# Patient Record
Sex: MALE | Race: WHITE | NOT HISPANIC OR LATINO | URBAN - METROPOLITAN AREA
[De-identification: names, ages, dates, MRNs, and addresses within clinical notes are randomized per-mention and may not be internally consistent; named-entity substitution may affect disease eponyms.]

---

## 2017-02-08 ENCOUNTER — OUTPATIENT (OUTPATIENT)
Dept: OUTPATIENT SERVICES | Facility: HOSPITAL | Age: 82
LOS: 1 days | End: 2017-02-08
Payer: MEDICARE

## 2017-02-08 VITALS
TEMPERATURE: 99 F | HEART RATE: 80 BPM | DIASTOLIC BLOOD PRESSURE: 80 MMHG | SYSTOLIC BLOOD PRESSURE: 142 MMHG | HEIGHT: 66 IN | WEIGHT: 151.9 LBS | RESPIRATION RATE: 16 BRPM

## 2017-02-08 DIAGNOSIS — R06.83 SNORING: ICD-10-CM

## 2017-02-08 DIAGNOSIS — Z87.438 PERSONAL HISTORY OF OTHER DISEASES OF MALE GENITAL ORGANS: Chronic | ICD-10-CM

## 2017-02-08 DIAGNOSIS — C66.1 MALIGNANT NEOPLASM OF RIGHT URETER: ICD-10-CM

## 2017-02-08 DIAGNOSIS — D49.5 NEOPLASM OF UNSPECIFIED BEHAVIOR OF OTHER GENITOURINARY ORGANS: Chronic | ICD-10-CM

## 2017-02-08 DIAGNOSIS — Z98.49 CATARACT EXTRACTION STATUS, UNSPECIFIED EYE: Chronic | ICD-10-CM

## 2017-02-08 DIAGNOSIS — I10 ESSENTIAL (PRIMARY) HYPERTENSION: ICD-10-CM

## 2017-02-08 LAB
APPEARANCE UR: CLEAR — SIGNIFICANT CHANGE UP
BILIRUB UR-MCNC: NEGATIVE — SIGNIFICANT CHANGE UP
BLOOD UR QL VISUAL: NEGATIVE — SIGNIFICANT CHANGE UP
BUN SERPL-MCNC: 30 MG/DL — HIGH (ref 7–23)
CALCIUM SERPL-MCNC: 9.9 MG/DL — SIGNIFICANT CHANGE UP (ref 8.4–10.5)
CHLORIDE SERPL-SCNC: 101 MMOL/L — SIGNIFICANT CHANGE UP (ref 98–107)
CO2 SERPL-SCNC: 25 MMOL/L — SIGNIFICANT CHANGE UP (ref 22–31)
COLOR SPEC: YELLOW — SIGNIFICANT CHANGE UP
CREAT SERPL-MCNC: 1.51 MG/DL — HIGH (ref 0.5–1.3)
GLUCOSE SERPL-MCNC: 95 MG/DL — SIGNIFICANT CHANGE UP (ref 70–99)
GLUCOSE UR-MCNC: NEGATIVE — SIGNIFICANT CHANGE UP
HCT VFR BLD CALC: 35.8 % — LOW (ref 39–50)
HGB BLD-MCNC: 11.8 G/DL — LOW (ref 13–17)
HYALINE CASTS # UR AUTO: SIGNIFICANT CHANGE UP (ref 0–?)
KETONES UR-MCNC: NEGATIVE — SIGNIFICANT CHANGE UP
LEUKOCYTE ESTERASE UR-ACNC: NEGATIVE — SIGNIFICANT CHANGE UP
MCHC RBC-ENTMCNC: 31.5 PG — SIGNIFICANT CHANGE UP (ref 27–34)
MCHC RBC-ENTMCNC: 33 % — SIGNIFICANT CHANGE UP (ref 32–36)
MCV RBC AUTO: 95.5 FL — SIGNIFICANT CHANGE UP (ref 80–100)
MUCOUS THREADS # UR AUTO: SIGNIFICANT CHANGE UP
NITRITE UR-MCNC: NEGATIVE — SIGNIFICANT CHANGE UP
NON-SQ EPI CELLS # UR AUTO: <1 — SIGNIFICANT CHANGE UP
PH UR: 6.5 — SIGNIFICANT CHANGE UP (ref 4.6–8)
PLATELET # BLD AUTO: 344 K/UL — SIGNIFICANT CHANGE UP (ref 150–400)
PMV BLD: 10.1 FL — SIGNIFICANT CHANGE UP (ref 7–13)
POTASSIUM SERPL-MCNC: 5 MMOL/L — SIGNIFICANT CHANGE UP (ref 3.5–5.3)
POTASSIUM SERPL-SCNC: 5 MMOL/L — SIGNIFICANT CHANGE UP (ref 3.5–5.3)
PROT UR-MCNC: 10 — SIGNIFICANT CHANGE UP
RBC # BLD: 3.75 M/UL — LOW (ref 4.2–5.8)
RBC # FLD: 13.4 % — SIGNIFICANT CHANGE UP (ref 10.3–14.5)
RBC CASTS # UR COMP ASSIST: SIGNIFICANT CHANGE UP (ref 0–?)
SODIUM SERPL-SCNC: 142 MMOL/L — SIGNIFICANT CHANGE UP (ref 135–145)
SP GR SPEC: 1.02 — SIGNIFICANT CHANGE UP (ref 1–1.03)
UROBILINOGEN FLD QL: NORMAL E.U. — SIGNIFICANT CHANGE UP (ref 0.1–0.2)
WBC # BLD: 10.7 K/UL — HIGH (ref 3.8–10.5)
WBC # FLD AUTO: 10.7 K/UL — HIGH (ref 3.8–10.5)
WBC UR QL: SIGNIFICANT CHANGE UP (ref 0–?)

## 2017-02-08 PROCEDURE — 93010 ELECTROCARDIOGRAM REPORT: CPT

## 2017-02-08 RX ORDER — SODIUM CHLORIDE 9 MG/ML
1000 INJECTION, SOLUTION INTRAVENOUS
Qty: 0 | Refills: 0 | Status: DISCONTINUED | OUTPATIENT
Start: 2017-02-22 | End: 2017-02-22

## 2017-02-08 NOTE — H&P PST ADULT - NEGATIVE OPHTHALMOLOGIC SYMPTOMS
no lacrimation R/no lacrimation L/no diplopia/no blurred vision L/no discharge L/no loss of vision L/no photophobia/no loss of vision R/no scleral injection L/no scleral injection R/no pain L/no blurred vision R/no irritation L/no discharge R/no irritation R/no pain R

## 2017-02-08 NOTE — H&P PST ADULT - NEGATIVE MUSCULOSKELETAL SYMPTOMS
no back pain/no arm pain R/no muscle weakness/no myalgia/no leg pain L/no neck pain/no joint swelling

## 2017-02-08 NOTE — H&P PST ADULT - RS GEN PE MLT RESP DETAILS PC
no wheezes/breath sounds equal/clear to auscultation bilaterally/respirations non-labored no rhonchi/no wheezes/clear to auscultation bilaterally/breath sounds equal/respirations non-labored/no rales

## 2017-02-08 NOTE — H&P PST ADULT - NEGATIVE GENERAL SYMPTOMS
no fatigue/no anorexia/no fever/no sweating/no malaise/no chills/no polyphagia/no polyuria/no weight gain/no weight loss

## 2017-02-08 NOTE — H&P PST ADULT - MUSCULOSKELETAL
details… detailed exam normal strength/no calf tenderness normal strength/no calf tenderness/ROM intact/no joint erythema/no joint warmth/no joint swelling

## 2017-02-08 NOTE — H&P PST ADULT - NSANTHOSAYNRD_GEN_A_CORE
No. MOOKIE screening performed.  STOP BANG Legend: 0-2 = LOW Risk; 3-4 = INTERMEDIATE Risk; 5-8 = HIGH Risk

## 2017-02-08 NOTE — H&P PST ADULT - NEGATIVE NEUROLOGICAL SYMPTOMS
no loss of consciousness/no facial palsy/no weakness/no transient paralysis/no loss of sensation/no hemiparesis/no focal seizures/no tremors/no headache/no generalized seizures/no syncope/no vertigo/no paresthesias

## 2017-02-08 NOTE — H&P PST ADULT - NEGATIVE ALLERGY TYPES
no reactions to food/no reactions to insect bites/no reactions to animals/no indoor environmental allergies/no outdoor environmental allergies/no reactions to medicines

## 2017-02-08 NOTE — H&P PST ADULT - PMH
BPH (benign prostatic hypertrophy)    GERD (gastroesophageal reflux disease)    HTN (hypertension)    Ureter ca, right  Dx 12/2015

## 2017-02-08 NOTE — H&P PST ADULT - NEGATIVE ENMT SYMPTOMS
no tinnitus/no nasal congestion/no dysphagia/no ear pain/no nose bleeds/no throat pain/no vertigo/no gum bleeding/no sinus symptoms

## 2017-02-08 NOTE — H&P PST ADULT - PROBLEM SELECTOR PLAN 1
83 yr old male presents for pre-op exam for cystoscopy, bilateral ureteroscopy laser fulguration, transurethral resection of bladder tumor on 2/15/17.  EKG, cbc, bmp, UA/C&S done.  Pre-op instructions provided, pt instructed to take AM Prilosec morning of surgery, pt verbalized good understanding.  Pt stated he had a "cold" 2 weeks ago, feels better now, completed Z-Pack, going back to PMD for recheck and clearance 2/10/17.

## 2017-02-08 NOTE — H&P PST ADULT - HISTORY OF PRESENT ILLNESS
83 yo white male, malignant right ureteral mass dx 12/15, s/p laser ablation 12/2015, 2/2016. [facility] C[facility], Right Uretersocopy, Laser Ablation of Ureteral tumor, Right Stent Exchange on 6/8/16. Presently denies back pain, dysuria, hematuria, fever, chest pain, SOB, changes in bowel/urinary habits. 83 yr old male, h/o HTN, BPH, GERD, ED, presents for rpe-op exam for malignant neoplasm right ureter, s/p laser ablation 12/2015, 2/2016, cystoscopy Right Ureteroscopy Laser Ablation of Ureteral tumor, Right Stent Exchange on 6/8/16.   No c/o hematuria, or dysuria.  Now scheduled for cystoscopy, bilateral ure 83 yr old male, h/o HTN, BPH, GERD, ED, presents for rpe-op exam for malignant neoplasm right ureter, s/p laser ablation 12/2015, 2/2016, cystoscopy Right Ureteroscopy Laser Ablation of Ureteral tumor, Right Stent Exchange on 6/8/16.   No c/o hematuria or dysuria.  Now scheduled for cystoscopy, bilateral ureteroscopy laser fulguration, transurethral resection of bladder tumor on 2/15/17.

## 2017-02-08 NOTE — H&P PST ADULT - NEGATIVE GENERAL GENITOURINARY SYMPTOMS
no bladder infections/no hematuria/no renal colic/no dysuria/no flank pain L/no flank pain R/no incontinence

## 2017-02-08 NOTE — H&P PST ADULT - PROBLEM SELECTOR PLAN 2
BP in PAST today 142/80, no complaints.  Pt instructed to take AM Cozaar morning of surgery, pt verbalized good understanding.

## 2017-02-08 NOTE — H&P PST ADULT - VENOUS THROMBOEMBOLISM CURRENT STATUS
present cancer or chemotherapy/major surgery, including arthroscopic and laparoscopic (greater than 1 hr)

## 2017-02-08 NOTE — H&P PST ADULT - PSH
H/O hydrocele    S/P cataract surgery    Ureteral tumor  cystoscopy, stent inserted 12/15 & 2/2016, Right Uretersocopy, Laser Ablation of Ureteral tumor, Right Stent Exchange on 6/8/16

## 2017-02-08 NOTE — H&P PST ADULT - NEUROLOGICAL DETAILS
alert and oriented x 3/responds to verbal commands/sensation intact/normal strength sensation intact/alert and oriented x 3/responds to pain/normal strength/responds to verbal commands

## 2017-02-08 NOTE — H&P PST ADULT - GASTROINTESTINAL DETAILS
no distention/bowel sounds normal/no masses palpable/no bruit/nontender/soft nontender/no masses palpable/bowel sounds normal/no distention/soft

## 2017-02-10 LAB — SPECIMEN SOURCE: SIGNIFICANT CHANGE UP

## 2017-02-12 LAB
-  AMPICILLIN: SIGNIFICANT CHANGE UP
-  CIPROFLOXACIN: SIGNIFICANT CHANGE UP
-  NITROFURANTOIN: SIGNIFICANT CHANGE UP
-  TETRACYCLINE: SIGNIFICANT CHANGE UP
-  VANCOMYCIN: SIGNIFICANT CHANGE UP
BACTERIA UR CULT: SIGNIFICANT CHANGE UP
METHOD TYPE: SIGNIFICANT CHANGE UP
ORGANISM # SPEC MICROSCOPIC CNT: SIGNIFICANT CHANGE UP
ORGANISM # SPEC MICROSCOPIC CNT: SIGNIFICANT CHANGE UP

## 2017-02-14 ENCOUNTER — MEDICATION RENEWAL (OUTPATIENT)
Age: 82
End: 2017-02-14

## 2017-02-14 DIAGNOSIS — N39.0 URINARY TRACT INFECTION, SITE NOT SPECIFIED: ICD-10-CM

## 2017-02-14 RX ORDER — AMOXICILLIN AND CLAVULANATE POTASSIUM 875; 125 MG/1; MG/1
875-125 TABLET, COATED ORAL
Qty: 6 | Refills: 0 | Status: ACTIVE | COMMUNITY
Start: 2017-02-14 | End: 1900-01-01

## 2017-02-17 ENCOUNTER — APPOINTMENT (OUTPATIENT)
Dept: UROLOGY | Facility: CLINIC | Age: 82
End: 2017-02-17

## 2017-02-21 ENCOUNTER — APPOINTMENT (OUTPATIENT)
Dept: UROLOGY | Facility: CLINIC | Age: 82
End: 2017-02-21

## 2017-02-21 ENCOUNTER — RESULT REVIEW (OUTPATIENT)
Age: 82
End: 2017-02-21

## 2017-02-22 ENCOUNTER — OUTPATIENT (OUTPATIENT)
Dept: OUTPATIENT SERVICES | Facility: HOSPITAL | Age: 82
LOS: 1 days | Discharge: HOME | End: 2017-02-22
Payer: MEDICARE

## 2017-02-22 ENCOUNTER — APPOINTMENT (OUTPATIENT)
Dept: UROLOGY | Facility: HOSPITAL | Age: 82
End: 2017-02-22

## 2017-02-22 VITALS
DIASTOLIC BLOOD PRESSURE: 80 MMHG | SYSTOLIC BLOOD PRESSURE: 168 MMHG | OXYGEN SATURATION: 96 % | RESPIRATION RATE: 17 BRPM | HEART RATE: 75 BPM

## 2017-02-22 VITALS
HEART RATE: 82 BPM | RESPIRATION RATE: 16 BRPM | HEIGHT: 66 IN | OXYGEN SATURATION: 98 % | TEMPERATURE: 98 F | DIASTOLIC BLOOD PRESSURE: 67 MMHG | SYSTOLIC BLOOD PRESSURE: 161 MMHG | WEIGHT: 151.9 LBS

## 2017-02-22 DIAGNOSIS — D49.5 NEOPLASM OF UNSPECIFIED BEHAVIOR OF OTHER GENITOURINARY ORGANS: Chronic | ICD-10-CM

## 2017-02-22 DIAGNOSIS — Z87.438 PERSONAL HISTORY OF OTHER DISEASES OF MALE GENITAL ORGANS: Chronic | ICD-10-CM

## 2017-02-22 DIAGNOSIS — C66.1 MALIGNANT NEOPLASM OF RIGHT URETER: ICD-10-CM

## 2017-02-22 DIAGNOSIS — Z98.49 CATARACT EXTRACTION STATUS, UNSPECIFIED EYE: Chronic | ICD-10-CM

## 2017-02-22 PROCEDURE — 88307 TISSUE EXAM BY PATHOLOGIST: CPT | Mod: 26

## 2017-02-22 PROCEDURE — 52224 CYSTOSCOPY AND TREATMENT: CPT

## 2017-02-22 PROCEDURE — 52351 CYSTOURETERO & OR PYELOSCOPE: CPT | Mod: RT

## 2017-02-22 RX ORDER — ACETAMINOPHEN 500 MG
2 TABLET ORAL
Qty: 0 | Refills: 0 | COMMUNITY
Start: 2017-02-22

## 2017-02-22 RX ORDER — LABETALOL HCL 100 MG
10 TABLET ORAL ONCE
Qty: 0 | Refills: 0 | Status: COMPLETED | OUTPATIENT
Start: 2017-02-22 | End: 2017-02-22

## 2017-02-22 RX ORDER — ACETAMINOPHEN 500 MG
650 TABLET ORAL EVERY 6 HOURS
Qty: 0 | Refills: 0 | Status: DISCONTINUED | OUTPATIENT
Start: 2017-02-22 | End: 2017-03-09

## 2017-02-22 RX ORDER — ASPIRIN/CALCIUM CARB/MAGNESIUM 324 MG
1 TABLET ORAL
Qty: 0 | Refills: 0 | COMMUNITY

## 2017-02-22 RX ORDER — LABETALOL HCL 100 MG
20 TABLET ORAL ONCE
Qty: 0 | Refills: 0 | Status: COMPLETED | OUTPATIENT
Start: 2017-02-22 | End: 2017-02-22

## 2017-02-22 RX ORDER — SODIUM CHLORIDE 9 MG/ML
1000 INJECTION, SOLUTION INTRAVENOUS
Qty: 0 | Refills: 0 | Status: DISCONTINUED | OUTPATIENT
Start: 2017-02-22 | End: 2017-03-09

## 2017-02-22 RX ADMIN — Medication 10 MILLIGRAM(S): at 14:07

## 2017-02-22 RX ADMIN — SODIUM CHLORIDE 30 MILLILITER(S): 9 INJECTION, SOLUTION INTRAVENOUS at 10:55

## 2017-02-22 RX ADMIN — Medication 20 MILLIGRAM(S): at 14:27

## 2017-02-22 NOTE — ASU DISCHARGE PLAN (ADULT/PEDIATRIC). - NOTIFY
Fever greater than 101/Persistent Nausea and Vomiting/Bleeding that does not stop/Unable to Urinate/Inability to Tolerate Liquids or Foods Inability to Tolerate Liquids or Foods/Unable to Urinate/Bleeding that does not stop/Fever greater than 101/Swelling that continues/Pain not relieved by Medications/Numbness, color, or temperature change to extremity/Persistent Nausea and Vomiting

## 2017-02-22 NOTE — ASU DISCHARGE PLAN (ADULT/PEDIATRIC). - MEDICATION SUMMARY - MEDICATIONS TO TAKE
I will START or STAY ON the medications listed below when I get home from the hospital:    Viagra 100 mg oral tablet  -- 1 tab(s) by mouth once a day, As Needed  -- Indication: For home med    acetaminophen 325 mg oral tablet  -- 2 tab(s) by mouth every 6 hours, As needed, mild to moderate pain  -- Indication: For Mild to moderate pain    Aspirin Low Dose 81 mg oral delayed release tablet  -- 1 tab(s) by mouth once a day - restart tomorrow if urine is clear  -- Indication: For home med    Cozaar 100 mg oral tablet  -- 1 tab(s) by mouth once a day  -- Indication: For home med    tamsulosin 0.4 mg oral capsule  -- 1 cap(s) by mouth once a day  -- Indication: For home med    magnesium citrate 100 mg oral tablet  -- 1 tab(s) by mouth   -- Indication: For home med    Co Q-10 100 mg oral capsule  -- 1 cap(s) by mouth once a day - last dose 2/8/17  -- Indication: For home med    Fish Oil oral capsule  -- 1  by mouth once a day - last dose 2/8/17  -- Indication: For home med    PriLOSEC OTC 20 mg oral delayed release tablet  -- 1 tab(s) by mouth once a day  -- Indication: For home med    multivitamin  -- 1 tab(s) by mouth once a day - last dose 2/8/17  -- Indication: For home med    Calcium 600+D oral tablet  -- 1 tab(s) by mouth once a day  -- Indication: For home med    Vitamin D2  -- 1 tab(s) by mouth once a day  -- Indication: For home med    Vitamin B1 100 mg oral tablet  -- 1 tab(s) by mouth once a day  -- Indication: For home med I will START or STAY ON the medications listed below when I get home from the hospital:    Viagra 100 mg oral tablet  -- 1 tab(s) by mouth once a day, As Needed  -- Indication: For home med    acetaminophen 325 mg oral tablet  -- 2 tab(s) by mouth every 6 hours, As needed, mild to moderate pain  -- Indication: For Mild to moderate pain    Aspirin Low Dose 81 mg oral delayed release tablet  -- 1 tab(s) by mouth once a day - restart tomorrow if urine is clear  -- Indication: For home med    Cozaar 100 mg oral tablet  -- 1 tab(s) by mouth once a day  -- Indication: For home med    tamsulosin 0.4 mg oral capsule  -- 1 cap(s) by mouth once a day  -- Indication: For home med    magnesium citrate 100 mg oral tablet  -- 1 tab(s) by mouth   -- Indication: For home med    Co Q-10 100 mg oral capsule  -- 1 cap(s) by mouth once a day - last dose 2/8/17  -- Indication: For home med    Fish Oil oral capsule  -- 1  by mouth once a day - last dose 2/8/17  -- Indication: For home med    Augmentin 875 mg-125 mg oral tablet  -- 1 tab(s) by mouth every 12 hours  -- Finish all this medication unless otherwise directed by prescriber.  Take with food or milk.    -- Indication: For post-op abx    PriLOSEC OTC 20 mg oral delayed release tablet  -- 1 tab(s) by mouth once a day  -- Indication: For home med    multivitamin  -- 1 tab(s) by mouth once a day - last dose 2/8/17  -- Indication: For home med    Calcium 600+D oral tablet  -- 1 tab(s) by mouth once a day  -- Indication: For home med    Vitamin D2  -- 1 tab(s) by mouth once a day  -- Indication: For home med    Vitamin B1 100 mg oral tablet  -- 1 tab(s) by mouth once a day  -- Indication: For home med

## 2017-02-22 NOTE — BRIEF OPERATIVE NOTE - PROCEDURE
Ureteroscopy, rigid  02/22/2017  right  Active  [id number]  Retrograde pyelography of both kidneys  02/22/2017    Active  [id number]  Bladder biopsy, transurethral  02/22/2017    [facility]

## 2017-02-23 ENCOUNTER — TRANSCRIPTION ENCOUNTER (OUTPATIENT)
Age: 82
End: 2017-02-23

## 2017-02-23 ENCOUNTER — RECORD ABSTRACTING (OUTPATIENT)
Age: 82
End: 2017-02-23

## 2017-02-23 LAB — SPECIMEN SOURCE: SIGNIFICANT CHANGE UP

## 2017-02-24 ENCOUNTER — APPOINTMENT (OUTPATIENT)
Dept: UROLOGY | Facility: CLINIC | Age: 82
End: 2017-02-24

## 2017-02-24 LAB — BACTERIA UR CULT: SIGNIFICANT CHANGE UP

## 2017-02-27 LAB — SURGICAL PATHOLOGY STUDY: SIGNIFICANT CHANGE UP

## 2017-03-01 ENCOUNTER — OUTPATIENT (OUTPATIENT)
Dept: OUTPATIENT SERVICES | Facility: HOSPITAL | Age: 82
LOS: 1 days | Discharge: HOME | End: 2017-03-01
Payer: MEDICARE

## 2017-03-01 DIAGNOSIS — D49.5 NEOPLASM OF UNSPECIFIED BEHAVIOR OF OTHER GENITOURINARY ORGANS: Chronic | ICD-10-CM

## 2017-03-01 DIAGNOSIS — Z98.49 CATARACT EXTRACTION STATUS, UNSPECIFIED EYE: Chronic | ICD-10-CM

## 2017-03-01 DIAGNOSIS — C66.1 MALIGNANT NEOPLASM OF RIGHT URETER: ICD-10-CM

## 2017-03-01 DIAGNOSIS — Z87.438 PERSONAL HISTORY OF OTHER DISEASES OF MALE GENITAL ORGANS: Chronic | ICD-10-CM

## 2017-03-01 PROCEDURE — 78708 K FLOW/FUNCT IMAGE W/DRUG: CPT | Mod: 26

## 2017-03-07 ENCOUNTER — APPOINTMENT (OUTPATIENT)
Dept: UROLOGY | Facility: CLINIC | Age: 82
End: 2017-03-07

## 2017-03-09 ENCOUNTER — APPOINTMENT (OUTPATIENT)
Dept: UROLOGY | Facility: CLINIC | Age: 82
End: 2017-03-09

## 2017-03-10 DIAGNOSIS — Z86.79 PERSONAL HISTORY OF OTHER DISEASES OF THE CIRCULATORY SYSTEM: ICD-10-CM

## 2017-03-10 DIAGNOSIS — K30 FUNCTIONAL DYSPEPSIA: ICD-10-CM

## 2017-03-10 DIAGNOSIS — Z80.0 FAMILY HISTORY OF MALIGNANT NEOPLASM OF DIGESTIVE ORGANS: ICD-10-CM

## 2017-03-13 ENCOUNTER — CHART COPY (OUTPATIENT)
Age: 82
End: 2017-03-13

## 2017-03-13 ENCOUNTER — OUTPATIENT (OUTPATIENT)
Dept: OUTPATIENT SERVICES | Facility: HOSPITAL | Age: 82
LOS: 1 days | End: 2017-03-13
Payer: MEDICARE

## 2017-03-13 ENCOUNTER — APPOINTMENT (OUTPATIENT)
Dept: INTERVENTIONAL RADIOLOGY/VASCULAR | Facility: CLINIC | Age: 82
End: 2017-03-13

## 2017-03-13 VITALS
DIASTOLIC BLOOD PRESSURE: 75 MMHG | HEART RATE: 84 BPM | RESPIRATION RATE: 18 BRPM | SYSTOLIC BLOOD PRESSURE: 154 MMHG | OXYGEN SATURATION: 97 % | WEIGHT: 150 LBS | BODY MASS INDEX: 22.73 KG/M2 | HEIGHT: 68 IN

## 2017-03-13 VITALS
SYSTOLIC BLOOD PRESSURE: 130 MMHG | DIASTOLIC BLOOD PRESSURE: 70 MMHG | WEIGHT: 147.93 LBS | TEMPERATURE: 99 F | HEIGHT: 66.5 IN | HEART RATE: 81 BPM | RESPIRATION RATE: 16 BRPM

## 2017-03-13 DIAGNOSIS — N40.0 BENIGN PROSTATIC HYPERPLASIA WITHOUT LOWER URINARY TRACT SYMPMS: ICD-10-CM

## 2017-03-13 DIAGNOSIS — D49.5 NEOPLASM OF UNSPECIFIED BEHAVIOR OF OTHER GENITOURINARY ORGANS: Chronic | ICD-10-CM

## 2017-03-13 DIAGNOSIS — C66.1 MALIGNANT NEOPLASM OF RIGHT URETER: ICD-10-CM

## 2017-03-13 DIAGNOSIS — Z98.890 OTHER SPECIFIED POSTPROCEDURAL STATES: Chronic | ICD-10-CM

## 2017-03-13 DIAGNOSIS — Z87.438 PERSONAL HISTORY OF OTHER DISEASES OF MALE GENITAL ORGANS: Chronic | ICD-10-CM

## 2017-03-13 DIAGNOSIS — Z98.49 CATARACT EXTRACTION STATUS, UNSPECIFIED EYE: Chronic | ICD-10-CM

## 2017-03-13 PROBLEM — K30 INDIGESTION: Status: RESOLVED | Noted: 2017-03-13 | Resolved: 2017-03-13

## 2017-03-13 PROBLEM — Z86.79 HISTORY OF HYPERTENSION: Status: RESOLVED | Noted: 2017-03-13 | Resolved: 2017-03-13

## 2017-03-13 LAB
APPEARANCE UR: CLEAR — SIGNIFICANT CHANGE UP
APTT BLD: 33.1 SEC — SIGNIFICANT CHANGE UP (ref 27.5–37.4)
BILIRUB UR-MCNC: NEGATIVE — SIGNIFICANT CHANGE UP
BLD GP AB SCN SERPL QL: NEGATIVE — SIGNIFICANT CHANGE UP
BLOOD UR QL VISUAL: HIGH
BUN SERPL-MCNC: 35 MG/DL — HIGH (ref 7–23)
CALCIUM SERPL-MCNC: 9.3 MG/DL — SIGNIFICANT CHANGE UP (ref 8.4–10.5)
CHLORIDE SERPL-SCNC: 102 MMOL/L — SIGNIFICANT CHANGE UP (ref 98–107)
CO2 SERPL-SCNC: 25 MMOL/L — SIGNIFICANT CHANGE UP (ref 22–31)
COLOR SPEC: YELLOW — SIGNIFICANT CHANGE UP
CREAT SERPL-MCNC: 1.41 MG/DL — HIGH (ref 0.5–1.3)
GLUCOSE SERPL-MCNC: 91 MG/DL — SIGNIFICANT CHANGE UP (ref 70–99)
GLUCOSE UR-MCNC: NEGATIVE — SIGNIFICANT CHANGE UP
HCT VFR BLD CALC: 37.4 % — LOW (ref 39–50)
HGB BLD-MCNC: 12.2 G/DL — LOW (ref 13–17)
INR BLD: 1.03 — SIGNIFICANT CHANGE UP (ref 0.87–1.18)
KETONES UR-MCNC: NEGATIVE — SIGNIFICANT CHANGE UP
LEUKOCYTE ESTERASE UR-ACNC: HIGH
MCHC RBC-ENTMCNC: 31.5 PG — SIGNIFICANT CHANGE UP (ref 27–34)
MCHC RBC-ENTMCNC: 32.6 % — SIGNIFICANT CHANGE UP (ref 32–36)
MCV RBC AUTO: 96.6 FL — SIGNIFICANT CHANGE UP (ref 80–100)
MUCOUS THREADS # UR AUTO: SIGNIFICANT CHANGE UP
NITRITE UR-MCNC: NEGATIVE — SIGNIFICANT CHANGE UP
PH UR: 6 — SIGNIFICANT CHANGE UP (ref 4.6–8)
PLATELET # BLD AUTO: 249 K/UL — SIGNIFICANT CHANGE UP (ref 150–400)
PMV BLD: 10.4 FL — SIGNIFICANT CHANGE UP (ref 7–13)
POTASSIUM SERPL-MCNC: 4.3 MMOL/L — SIGNIFICANT CHANGE UP (ref 3.5–5.3)
POTASSIUM SERPL-SCNC: 4.3 MMOL/L — SIGNIFICANT CHANGE UP (ref 3.5–5.3)
PROT UR-MCNC: 20 — SIGNIFICANT CHANGE UP
PROTHROM AB SERPL-ACNC: 11.7 SEC — SIGNIFICANT CHANGE UP (ref 10–13.1)
RBC # BLD: 3.87 M/UL — LOW (ref 4.2–5.8)
RBC # FLD: 14.4 % — SIGNIFICANT CHANGE UP (ref 10.3–14.5)
RBC CASTS # UR COMP ASSIST: >50 — HIGH (ref 0–?)
RH IG SCN BLD-IMP: POSITIVE — SIGNIFICANT CHANGE UP
SODIUM SERPL-SCNC: 141 MMOL/L — SIGNIFICANT CHANGE UP (ref 135–145)
SP GR SPEC: 1.02 — SIGNIFICANT CHANGE UP (ref 1–1.03)
UROBILINOGEN FLD QL: NORMAL E.U. — SIGNIFICANT CHANGE UP (ref 0.1–0.2)
WBC # BLD: 8.48 K/UL — SIGNIFICANT CHANGE UP (ref 3.8–10.5)
WBC # FLD AUTO: 8.48 K/UL — SIGNIFICANT CHANGE UP (ref 3.8–10.5)
WBC CLUMPS #/AREA URNS HPF: PRESENT — HIGH (ref 0–?)
WBC UR QL: SIGNIFICANT CHANGE UP (ref 0–?)

## 2017-03-13 PROCEDURE — 93010 ELECTROCARDIOGRAM REPORT: CPT

## 2017-03-13 RX ORDER — TAMSULOSIN HYDROCHLORIDE 0.4 MG/1
0.4 CAPSULE ORAL
Qty: 180 | Refills: 0 | Status: ACTIVE | COMMUNITY
Start: 2016-10-24

## 2017-03-13 RX ORDER — LOSARTAN POTASSIUM 100 MG/1
100 TABLET, FILM COATED ORAL
Qty: 90 | Refills: 0 | Status: ACTIVE | COMMUNITY
Start: 2016-09-12

## 2017-03-13 RX ORDER — ERGOCALCIFEROL 1.25 MG/1
1 CAPSULE ORAL
Qty: 0 | Refills: 0 | COMMUNITY

## 2017-03-13 RX ORDER — OMEPRAZOLE 10 MG/1
1 CAPSULE, DELAYED RELEASE ORAL
Qty: 0 | Refills: 0 | COMMUNITY

## 2017-03-13 RX ORDER — OMEGA-3 ACID ETHYL ESTERS 1 G
1 CAPSULE ORAL
Qty: 0 | Refills: 0 | COMMUNITY

## 2017-03-13 RX ORDER — MULTIVIT WITH MIN/MFOLATE/K2 340-15/3 G
1 POWDER (GRAM) ORAL
Qty: 0 | Refills: 0 | COMMUNITY

## 2017-03-13 RX ORDER — THIAMINE MONONITRATE (VIT B1) 100 MG
1 TABLET ORAL
Qty: 0 | Refills: 0 | COMMUNITY

## 2017-03-13 RX ORDER — OMEPRAZOLE MAGNESIUM 20 MG/1
20 CAPSULE, DELAYED RELEASE ORAL
Refills: 0 | Status: ACTIVE | COMMUNITY

## 2017-03-13 RX ORDER — ASPIRIN/CALCIUM CARB/MAGNESIUM 324 MG
1 TABLET ORAL
Qty: 0 | Refills: 0 | COMMUNITY

## 2017-03-13 RX ORDER — LOSARTAN POTASSIUM 100 MG/1
1 TABLET, FILM COATED ORAL
Qty: 0 | Refills: 0 | COMMUNITY

## 2017-03-13 RX ORDER — TAMSULOSIN HYDROCHLORIDE 0.4 MG/1
1 CAPSULE ORAL
Qty: 0 | Refills: 0 | COMMUNITY

## 2017-03-13 RX ORDER — UBIDECARENONE 100 MG
1 CAPSULE ORAL
Qty: 0 | Refills: 0 | COMMUNITY

## 2017-03-13 RX ORDER — AZITHROMYCIN 250 MG/1
250 TABLET, FILM COATED ORAL
Qty: 6 | Refills: 0 | Status: COMPLETED | COMMUNITY
Start: 2017-02-06

## 2017-03-13 NOTE — H&P PST ADULT - NEGATIVE MUSCULOSKELETAL SYMPTOMS
no joint swelling/no leg pain L/no myalgia/no arm pain R/no back pain/no muscle weakness/no neck pain

## 2017-03-13 NOTE — H&P PST ADULT - ASSESSMENT
83 yr old male, h/o HTN, BPH, GERD,  presents for pre-op exam for malignant neoplasm right ureter. S/P laser ablation 12/2015, 2/2016,   S/P cystoscopy 2/2017.  Per pt, right ureter is blocked.  Now scheduled for Right Antegrade ureteroscopy, Robotic Right Ureteral resection and reimplant 3/20/17

## 2017-03-13 NOTE — H&P PST ADULT - PSH
H/O hydrocele    S/P cataract surgery    Ureteral tumor  cystoscopy, stent inserted 12/15 & 2/2016, Right Uretersocopy, Laser Ablation of Ureteral tumor, Right Stent Exchange on 6/8/16 H/O cystoscopy  2/2017  H/O hydrocele    S/P cataract surgery    Ureteral tumor  cystoscopy, stent inserted 12/15 & 2/2016, Right Uretersocopy, Laser Ablation of Ureteral tumor, Right Stent Exchange on 6/8/16

## 2017-03-13 NOTE — H&P PST ADULT - NEGATIVE GENERAL GENITOURINARY SYMPTOMS
no dysuria/no hematuria/normal urinary frequency/no incontinence/no bladder infections/no renal colic/no flank pain L/no flank pain R

## 2017-03-13 NOTE — H&P PST ADULT - ABILITY TO HEAR (WITH HEARING AID OR HEARING APPLIANCE IF NORMALLY USED):
left ear worse than right ear/Mildly to Moderately Impaired: difficulty hearing in some environments or speaker may need to increase volume or speak distinctly

## 2017-03-13 NOTE — H&P PST ADULT - NEGATIVE ENMT SYMPTOMS
no tinnitus/no nose bleeds/no throat pain/no dysphagia/no vertigo/no sinus symptoms/no gum bleeding/no ear pain

## 2017-03-13 NOTE — H&P PST ADULT - NEUROLOGICAL DETAILS
alert and oriented x 3/responds to pain/normal strength/sensation intact/responds to verbal commands

## 2017-03-13 NOTE — H&P PST ADULT - HISTORY OF PRESENT ILLNESS
83 yr old male, h/o HTN, BPH, GERD,  presents for pre-op exam for malignant neoplasm right ureter. S/P laser ablation 12/2015, 2/2016,   S/P cystoscopy 2/2017.  Per pt, ureter is blocked.  Now scheduled for Right Antegrade ureteroscopy, Robotic Right Ureteral resection and reimplant 3/20/17. 83 yr old male, h/o HTN, BPH, GERD,  presents for pre-op exam for malignant neoplasm right ureter. S/P laser ablation 12/2015, 2/2016,   S/P cystoscopy 2/2017.  Per pt, right ureter is blocked.  Now scheduled for Right Antegrade ureteroscopy, Robotic Right Ureteral resection and reimplant 3/20/17.

## 2017-03-13 NOTE — H&P PST ADULT - ATTENDING COMMENTS
all r/b/a and complications discussed  discussed distal ureterectomy, possible nephroureterectomy, LND  possible Boari/advancement flap   ALL side effects and potential complications reviewed.

## 2017-03-13 NOTE — H&P PST ADULT - NEGATIVE GENERAL SYMPTOMS
no weight gain/no malaise/no fever/no polyphagia/no polyuria/no anorexia/no fatigue/no sweating/no weight loss/no chills

## 2017-03-13 NOTE — H&P PST ADULT - MUSCULOSKELETAL
details… detailed exam no joint erythema/no joint swelling/ROM intact/no calf tenderness/normal strength/no joint warmth decreased ROM due to pain/no joint swelling/ROM intact/normal strength/no joint warmth/left shoulder pain with movement/no joint erythema/no calf tenderness

## 2017-03-13 NOTE — H&P PST ADULT - NEGATIVE ALLERGY TYPES
no reactions to food/no reactions to insect bites/no indoor environmental allergies/no reactions to animals/no outdoor environmental allergies/no reactions to medicines

## 2017-03-13 NOTE — H&P PST ADULT - PROBLEM SELECTOR PLAN 1
Right Antegrade ureteroscopy, Robotic Right Ureteral resection and reimplant 3/20/17.    CBC BMP PT/PTT T&S UA CS EKG    Pt reports he has appt to see PMD for clearance    MOOKIE precautions, OR booking informed

## 2017-03-13 NOTE — H&P PST ADULT - GENITOURINARY COMMENTS
83 yr old male, h/o HTN, BPH, GERD,  presents for pre-op exam for malignant neoplasm right ureter. S/P laser ablation 12/2015, 2/2016,   S/P cystoscopy 2/2017.  Per pt, ureter is blocked.  Now scheduled for Right Antegrade ureteroscopy, Robotic Right Ureteral resection and reimplant 3/20/17. Hx of HTN, BPH, GERD,  presents for pre-op exam for malignant neoplasm right ureter. S/P laser ablation 12/2015, 2/2016,   S/P cystoscopy 2/2017.  Per pt, right ureter is blocked.  Now scheduled for Right Antegrade ureteroscopy, Robotic Right Ureteral resection and reimplant 3/20/17.

## 2017-03-13 NOTE — H&P PST ADULT - RS GEN PE MLT RESP DETAILS PC
no rales/no rhonchi/clear to auscultation bilaterally/breath sounds equal/respirations non-labored/no wheezes

## 2017-03-13 NOTE — H&P PST ADULT - NEGATIVE NEUROLOGICAL SYMPTOMS
no tremors/no headache/no syncope/no paresthesias/no loss of sensation/no loss of consciousness/no facial palsy/no weakness/no vertigo/no generalized seizures/no transient paralysis/no hemiparesis/no focal seizures

## 2017-03-14 LAB — SPECIMEN SOURCE: SIGNIFICANT CHANGE UP

## 2017-03-16 ENCOUNTER — FORM ENCOUNTER (OUTPATIENT)
Age: 82
End: 2017-03-16

## 2017-03-17 ENCOUNTER — OUTPATIENT (OUTPATIENT)
Dept: OUTPATIENT SERVICES | Facility: HOSPITAL | Age: 82
LOS: 1 days | End: 2017-03-17
Payer: MEDICARE

## 2017-03-17 DIAGNOSIS — N13.30 UNSPECIFIED HYDRONEPHROSIS: ICD-10-CM

## 2017-03-17 DIAGNOSIS — Z98.49 CATARACT EXTRACTION STATUS, UNSPECIFIED EYE: Chronic | ICD-10-CM

## 2017-03-17 DIAGNOSIS — Z87.438 PERSONAL HISTORY OF OTHER DISEASES OF MALE GENITAL ORGANS: Chronic | ICD-10-CM

## 2017-03-17 DIAGNOSIS — Z98.890 OTHER SPECIFIED POSTPROCEDURAL STATES: Chronic | ICD-10-CM

## 2017-03-17 DIAGNOSIS — D49.5 NEOPLASM OF UNSPECIFIED BEHAVIOR OF OTHER GENITOURINARY ORGANS: Chronic | ICD-10-CM

## 2017-03-17 PROCEDURE — 50432 PLMT NEPHROSTOMY CATHETER: CPT | Mod: RT

## 2017-03-17 RX ORDER — SODIUM CHLORIDE 9 MG/ML
1000 INJECTION INTRAMUSCULAR; INTRAVENOUS; SUBCUTANEOUS
Qty: 0 | Refills: 0 | Status: DISCONTINUED | OUTPATIENT
Start: 2017-03-20 | End: 2017-03-20

## 2017-03-17 NOTE — ASU PATIENT PROFILE, ADULT - REASON FOR ADMISSION, PROFILE
I'm having surgery on my Right Ureter and they will be checking my Right Kidney with possible surgery if necessary

## 2017-03-19 ENCOUNTER — RESULT REVIEW (OUTPATIENT)
Age: 82
End: 2017-03-19

## 2017-03-19 LAB
-  AMPICILLIN: SIGNIFICANT CHANGE UP
-  CIPROFLOXACIN: SIGNIFICANT CHANGE UP
-  LINEZOLID: SIGNIFICANT CHANGE UP
-  NITROFURANTOIN: SIGNIFICANT CHANGE UP
-  TETRACYCLINE: SIGNIFICANT CHANGE UP
-  VANCOMYCIN: SIGNIFICANT CHANGE UP
BACTERIA UR CULT: SIGNIFICANT CHANGE UP
METHOD TYPE: SIGNIFICANT CHANGE UP
ORGANISM # SPEC MICROSCOPIC CNT: SIGNIFICANT CHANGE UP
ORGANISM # SPEC MICROSCOPIC CNT: SIGNIFICANT CHANGE UP

## 2017-03-20 ENCOUNTER — INPATIENT (INPATIENT)
Facility: HOSPITAL | Age: 82
LOS: 1 days | Discharge: HOME HEALTH | End: 2017-03-22
Attending: UROLOGY | Admitting: UROLOGY
Payer: MEDICARE

## 2017-03-20 ENCOUNTER — APPOINTMENT (OUTPATIENT)
Dept: UROLOGY | Facility: HOSPITAL | Age: 82
End: 2017-03-20

## 2017-03-20 VITALS
HEIGHT: 66.5 IN | RESPIRATION RATE: 18 BRPM | SYSTOLIC BLOOD PRESSURE: 139 MMHG | DIASTOLIC BLOOD PRESSURE: 66 MMHG | WEIGHT: 147.93 LBS | HEART RATE: 76 BPM | TEMPERATURE: 98 F | OXYGEN SATURATION: 97 %

## 2017-03-20 DIAGNOSIS — Z98.49 CATARACT EXTRACTION STATUS, UNSPECIFIED EYE: Chronic | ICD-10-CM

## 2017-03-20 DIAGNOSIS — Z98.890 OTHER SPECIFIED POSTPROCEDURAL STATES: Chronic | ICD-10-CM

## 2017-03-20 DIAGNOSIS — C66.1 MALIGNANT NEOPLASM OF RIGHT URETER: ICD-10-CM

## 2017-03-20 DIAGNOSIS — D49.5 NEOPLASM OF UNSPECIFIED BEHAVIOR OF OTHER GENITOURINARY ORGANS: Chronic | ICD-10-CM

## 2017-03-20 DIAGNOSIS — Z87.438 PERSONAL HISTORY OF OTHER DISEASES OF MALE GENITAL ORGANS: Chronic | ICD-10-CM

## 2017-03-20 LAB — RH IG SCN BLD-IMP: POSITIVE — SIGNIFICANT CHANGE UP

## 2017-03-20 PROCEDURE — 50551 KIDNEY ENDOSCOPY: CPT | Mod: RT

## 2017-03-20 PROCEDURE — 74000: CPT | Mod: 26

## 2017-03-20 PROCEDURE — 74425 UROGRAPHY ANTEGRADE RS&I: CPT | Mod: 26

## 2017-03-20 PROCEDURE — 50947 LAPARO NEW URETER/BLADDER: CPT

## 2017-03-20 PROCEDURE — 38571 LAPAROSCOPY LYMPHADENECTOMY: CPT

## 2017-03-20 PROCEDURE — 88307 TISSUE EXAM BY PATHOLOGIST: CPT | Mod: 26

## 2017-03-20 PROCEDURE — 50432 PLMT NEPHROSTOMY CATHETER: CPT | Mod: RT

## 2017-03-20 PROCEDURE — 52351 CYSTOURETERO & OR PYELOSCOPE: CPT | Mod: RT

## 2017-03-20 PROCEDURE — 50389 REMOVE RENAL TUBE W/FLUORO: CPT | Mod: RT

## 2017-03-20 RX ORDER — LOSARTAN POTASSIUM 100 MG/1
1 TABLET, FILM COATED ORAL
Qty: 0 | Refills: 0 | COMMUNITY

## 2017-03-20 RX ORDER — OMEPRAZOLE 10 MG/1
1 CAPSULE, DELAYED RELEASE ORAL
Qty: 0 | Refills: 0 | COMMUNITY

## 2017-03-20 RX ORDER — LINEZOLID 600 MG/300ML
600 INJECTION, SOLUTION INTRAVENOUS ONCE
Qty: 0 | Refills: 0 | Status: COMPLETED | OUTPATIENT
Start: 2017-03-20 | End: 2017-03-20

## 2017-03-20 RX ORDER — ONDANSETRON 8 MG/1
4 TABLET, FILM COATED ORAL ONCE
Qty: 0 | Refills: 0 | Status: DISCONTINUED | OUTPATIENT
Start: 2017-03-20 | End: 2017-03-20

## 2017-03-20 RX ORDER — SENNA PLUS 8.6 MG/1
2 TABLET ORAL AT BEDTIME
Qty: 0 | Refills: 0 | Status: DISCONTINUED | OUTPATIENT
Start: 2017-03-20 | End: 2017-03-22

## 2017-03-20 RX ORDER — LINEZOLID 600 MG/300ML
600 INJECTION, SOLUTION INTRAVENOUS EVERY 12 HOURS
Qty: 0 | Refills: 0 | Status: DISCONTINUED | OUTPATIENT
Start: 2017-03-20 | End: 2017-03-21

## 2017-03-20 RX ORDER — ASPIRIN/CALCIUM CARB/MAGNESIUM 324 MG
1 TABLET ORAL
Qty: 0 | Refills: 0 | COMMUNITY

## 2017-03-20 RX ORDER — TAMSULOSIN HYDROCHLORIDE 0.4 MG/1
2 CAPSULE ORAL
Qty: 0 | Refills: 0 | COMMUNITY

## 2017-03-20 RX ORDER — HYDROMORPHONE HYDROCHLORIDE 2 MG/ML
0.2 INJECTION INTRAMUSCULAR; INTRAVENOUS; SUBCUTANEOUS
Qty: 0 | Refills: 0 | Status: DISCONTINUED | OUTPATIENT
Start: 2017-03-20 | End: 2017-03-20

## 2017-03-20 RX ORDER — DOCUSATE SODIUM 100 MG
100 CAPSULE ORAL THREE TIMES A DAY
Qty: 0 | Refills: 0 | Status: DISCONTINUED | OUTPATIENT
Start: 2017-03-20 | End: 2017-03-22

## 2017-03-20 RX ORDER — LINEZOLID 600 MG/300ML
600 INJECTION, SOLUTION INTRAVENOUS EVERY 12 HOURS
Qty: 0 | Refills: 0 | Status: DISCONTINUED | OUTPATIENT
Start: 2017-03-20 | End: 2017-03-20

## 2017-03-20 RX ORDER — MORPHINE SULFATE 50 MG/1
2 CAPSULE, EXTENDED RELEASE ORAL EVERY 6 HOURS
Qty: 0 | Refills: 0 | Status: DISCONTINUED | OUTPATIENT
Start: 2017-03-20 | End: 2017-03-22

## 2017-03-20 RX ORDER — HYDROMORPHONE HYDROCHLORIDE 2 MG/ML
0.4 INJECTION INTRAMUSCULAR; INTRAVENOUS; SUBCUTANEOUS
Qty: 0 | Refills: 0 | Status: DISCONTINUED | OUTPATIENT
Start: 2017-03-20 | End: 2017-03-20

## 2017-03-20 RX ORDER — HEPARIN SODIUM 5000 [USP'U]/ML
5000 INJECTION INTRAVENOUS; SUBCUTANEOUS EVERY 8 HOURS
Qty: 0 | Refills: 0 | Status: DISCONTINUED | OUTPATIENT
Start: 2017-03-20 | End: 2017-03-22

## 2017-03-20 RX ORDER — ACETAMINOPHEN 500 MG
650 TABLET ORAL EVERY 6 HOURS
Qty: 0 | Refills: 0 | Status: DISCONTINUED | OUTPATIENT
Start: 2017-03-20 | End: 2017-03-22

## 2017-03-20 RX ORDER — UBIDECARENONE 100 MG
1 CAPSULE ORAL
Qty: 0 | Refills: 0 | COMMUNITY

## 2017-03-20 RX ORDER — OMEGA-3 ACID ETHYL ESTERS 1 G
1 CAPSULE ORAL
Qty: 0 | Refills: 0 | COMMUNITY

## 2017-03-20 RX ORDER — SODIUM CHLORIDE 9 MG/ML
1000 INJECTION, SOLUTION INTRAVENOUS
Qty: 0 | Refills: 0 | Status: DISCONTINUED | OUTPATIENT
Start: 2017-03-20 | End: 2017-03-21

## 2017-03-20 RX ADMIN — HYDROMORPHONE HYDROCHLORIDE 0.4 MILLIGRAM(S): 2 INJECTION INTRAMUSCULAR; INTRAVENOUS; SUBCUTANEOUS at 13:00

## 2017-03-20 RX ADMIN — LINEZOLID 300 MILLIGRAM(S): 600 INJECTION, SOLUTION INTRAVENOUS at 12:47

## 2017-03-20 RX ADMIN — SODIUM CHLORIDE 100 MILLILITER(S): 9 INJECTION, SOLUTION INTRAVENOUS at 12:30

## 2017-03-20 RX ADMIN — Medication 650 MILLIGRAM(S): at 22:09

## 2017-03-20 RX ADMIN — Medication 650 MILLIGRAM(S): at 23:00

## 2017-03-20 RX ADMIN — Medication 100 MILLIGRAM(S): at 22:09

## 2017-03-20 RX ADMIN — HYDROMORPHONE HYDROCHLORIDE 0.4 MILLIGRAM(S): 2 INJECTION INTRAMUSCULAR; INTRAVENOUS; SUBCUTANEOUS at 13:15

## 2017-03-20 RX ADMIN — HEPARIN SODIUM 5000 UNIT(S): 5000 INJECTION INTRAVENOUS; SUBCUTANEOUS at 17:39

## 2017-03-20 RX ADMIN — SENNA PLUS 2 TABLET(S): 8.6 TABLET ORAL at 22:09

## 2017-03-20 NOTE — PATIENT PROFILE ADULT. - PSH
H/O cystoscopy  2/2017  H/O hydrocele    S/P cataract surgery    Ureteral tumor  cystoscopy, stent inserted 12/15 & 2/2016, Right Uretersocopy, Laser Ablation of Ureteral tumor, Right Stent Exchange on 6/8/16

## 2017-03-20 NOTE — BRIEF OPERATIVE NOTE - PRE-OP DX
Cancer of ureter  03/13/2017    Active  Shania Fitzgerald  Hypertension  03/13/2017    Shania Rod  Hypertension  03/13/2017    Shania Rod  Neoplasm of ureter  03/13/2017    Shania Rod

## 2017-03-21 ENCOUNTER — TRANSCRIPTION ENCOUNTER (OUTPATIENT)
Age: 82
End: 2017-03-21

## 2017-03-21 DIAGNOSIS — N13.1 HYDRONEPHROSIS WITH URETERAL STRICTURE, NOT ELSEWHERE CLASSIFIED: ICD-10-CM

## 2017-03-21 DIAGNOSIS — N13.30 UNSPECIFIED HYDRONEPHROSIS: ICD-10-CM

## 2017-03-21 LAB
BLD GP AB SCN SERPL QL: NEGATIVE — SIGNIFICANT CHANGE UP
BUN SERPL-MCNC: 20 MG/DL — SIGNIFICANT CHANGE UP (ref 7–23)
CALCIUM SERPL-MCNC: 9.2 MG/DL — SIGNIFICANT CHANGE UP (ref 8.4–10.5)
CHLORIDE SERPL-SCNC: 100 MMOL/L — SIGNIFICANT CHANGE UP (ref 98–107)
CO2 SERPL-SCNC: 25 MMOL/L — SIGNIFICANT CHANGE UP (ref 22–31)
CREAT SERPL-MCNC: 1.17 MG/DL — SIGNIFICANT CHANGE UP (ref 0.5–1.3)
GLUCOSE SERPL-MCNC: 103 MG/DL — HIGH (ref 70–99)
HCT VFR BLD CALC: 37.5 % — LOW (ref 39–50)
HGB BLD-MCNC: 12.5 G/DL — LOW (ref 13–17)
MCHC RBC-ENTMCNC: 31.4 PG — SIGNIFICANT CHANGE UP (ref 27–34)
MCHC RBC-ENTMCNC: 33.3 % — SIGNIFICANT CHANGE UP (ref 32–36)
MCV RBC AUTO: 94.2 FL — SIGNIFICANT CHANGE UP (ref 80–100)
PLATELET # BLD AUTO: 258 K/UL — SIGNIFICANT CHANGE UP (ref 150–400)
PMV BLD: 9.9 FL — SIGNIFICANT CHANGE UP (ref 7–13)
POTASSIUM SERPL-MCNC: 4.6 MMOL/L — SIGNIFICANT CHANGE UP (ref 3.5–5.3)
POTASSIUM SERPL-SCNC: 4.6 MMOL/L — SIGNIFICANT CHANGE UP (ref 3.5–5.3)
RBC # BLD: 3.98 M/UL — LOW (ref 4.2–5.8)
RBC # FLD: 13.6 % — SIGNIFICANT CHANGE UP (ref 10.3–14.5)
RH IG SCN BLD-IMP: POSITIVE — SIGNIFICANT CHANGE UP
SODIUM SERPL-SCNC: 139 MMOL/L — SIGNIFICANT CHANGE UP (ref 135–145)
WBC # BLD: 10.56 K/UL — HIGH (ref 3.8–10.5)
WBC # FLD AUTO: 10.56 K/UL — HIGH (ref 3.8–10.5)

## 2017-03-21 PROCEDURE — 99223 1ST HOSP IP/OBS HIGH 75: CPT

## 2017-03-21 RX ORDER — AMPICILLIN TRIHYDRATE 250 MG
CAPSULE ORAL
Qty: 0 | Refills: 0 | Status: DISCONTINUED | OUTPATIENT
Start: 2017-03-21 | End: 2017-03-22

## 2017-03-21 RX ORDER — SODIUM CHLORIDE 9 MG/ML
1000 INJECTION, SOLUTION INTRAVENOUS
Qty: 0 | Refills: 0 | Status: DISCONTINUED | OUTPATIENT
Start: 2017-03-21 | End: 2017-03-22

## 2017-03-21 RX ORDER — ZOLPIDEM TARTRATE 10 MG/1
5 TABLET ORAL ONCE
Qty: 0 | Refills: 0 | Status: DISCONTINUED | OUTPATIENT
Start: 2017-03-21 | End: 2017-03-21

## 2017-03-21 RX ORDER — AMPICILLIN TRIHYDRATE 250 MG
2 CAPSULE ORAL EVERY 6 HOURS
Qty: 0 | Refills: 0 | Status: DISCONTINUED | OUTPATIENT
Start: 2017-03-21 | End: 2017-03-22

## 2017-03-21 RX ORDER — AMPICILLIN TRIHYDRATE 250 MG
2 CAPSULE ORAL ONCE
Qty: 0 | Refills: 0 | Status: COMPLETED | OUTPATIENT
Start: 2017-03-21 | End: 2017-03-21

## 2017-03-21 RX ORDER — LOSARTAN POTASSIUM 100 MG/1
100 TABLET, FILM COATED ORAL DAILY
Qty: 0 | Refills: 0 | Status: DISCONTINUED | OUTPATIENT
Start: 2017-03-21 | End: 2017-03-22

## 2017-03-21 RX ADMIN — Medication 650 MILLIGRAM(S): at 23:05

## 2017-03-21 RX ADMIN — HEPARIN SODIUM 5000 UNIT(S): 5000 INJECTION INTRAVENOUS; SUBCUTANEOUS at 22:21

## 2017-03-21 RX ADMIN — Medication 100 MILLIGRAM(S): at 22:21

## 2017-03-21 RX ADMIN — Medication 100 MILLIGRAM(S): at 08:12

## 2017-03-21 RX ADMIN — ZOLPIDEM TARTRATE 5 MILLIGRAM(S): 10 TABLET ORAL at 22:25

## 2017-03-21 RX ADMIN — Medication 216 GRAM(S): at 09:28

## 2017-03-21 RX ADMIN — Medication 100 MILLIGRAM(S): at 14:57

## 2017-03-21 RX ADMIN — SENNA PLUS 2 TABLET(S): 8.6 TABLET ORAL at 22:21

## 2017-03-21 RX ADMIN — LOSARTAN POTASSIUM 100 MILLIGRAM(S): 100 TABLET, FILM COATED ORAL at 08:12

## 2017-03-21 RX ADMIN — HEPARIN SODIUM 5000 UNIT(S): 5000 INJECTION INTRAVENOUS; SUBCUTANEOUS at 08:12

## 2017-03-21 RX ADMIN — Medication 216 GRAM(S): at 18:59

## 2017-03-21 RX ADMIN — Medication 650 MILLIGRAM(S): at 16:51

## 2017-03-21 RX ADMIN — HEPARIN SODIUM 5000 UNIT(S): 5000 INJECTION INTRAVENOUS; SUBCUTANEOUS at 00:00

## 2017-03-21 RX ADMIN — Medication 650 MILLIGRAM(S): at 22:22

## 2017-03-21 RX ADMIN — SODIUM CHLORIDE 100 MILLILITER(S): 9 INJECTION, SOLUTION INTRAVENOUS at 08:14

## 2017-03-21 RX ADMIN — HEPARIN SODIUM 5000 UNIT(S): 5000 INJECTION INTRAVENOUS; SUBCUTANEOUS at 14:51

## 2017-03-21 RX ADMIN — Medication 650 MILLIGRAM(S): at 15:59

## 2017-03-21 RX ADMIN — LINEZOLID 300 MILLIGRAM(S): 600 INJECTION, SOLUTION INTRAVENOUS at 00:00

## 2017-03-21 RX ADMIN — Medication 216 GRAM(S): at 13:21

## 2017-03-22 ENCOUNTER — TRANSCRIPTION ENCOUNTER (OUTPATIENT)
Age: 82
End: 2017-03-22

## 2017-03-22 VITALS
SYSTOLIC BLOOD PRESSURE: 148 MMHG | OXYGEN SATURATION: 99 % | HEART RATE: 87 BPM | TEMPERATURE: 99 F | RESPIRATION RATE: 16 BRPM | DIASTOLIC BLOOD PRESSURE: 72 MMHG

## 2017-03-22 LAB
BUN SERPL-MCNC: 16 MG/DL — SIGNIFICANT CHANGE UP (ref 7–23)
CALCIUM SERPL-MCNC: 9.2 MG/DL — SIGNIFICANT CHANGE UP (ref 8.4–10.5)
CHLORIDE SERPL-SCNC: 101 MMOL/L — SIGNIFICANT CHANGE UP (ref 98–107)
CO2 SERPL-SCNC: 23 MMOL/L — SIGNIFICANT CHANGE UP (ref 22–31)
CREAT FLD-MCNC: 1.15 MG/DL — SIGNIFICANT CHANGE UP
CREAT SERPL-MCNC: 1.22 MG/DL — SIGNIFICANT CHANGE UP (ref 0.5–1.3)
GLUCOSE SERPL-MCNC: 113 MG/DL — HIGH (ref 70–99)
POTASSIUM SERPL-MCNC: 4.4 MMOL/L — SIGNIFICANT CHANGE UP (ref 3.5–5.3)
POTASSIUM SERPL-SCNC: 4.4 MMOL/L — SIGNIFICANT CHANGE UP (ref 3.5–5.3)
SODIUM SERPL-SCNC: 137 MMOL/L — SIGNIFICANT CHANGE UP (ref 135–145)

## 2017-03-22 PROCEDURE — 99233 SBSQ HOSP IP/OBS HIGH 50: CPT

## 2017-03-22 RX ORDER — ASPIRIN/CALCIUM CARB/MAGNESIUM 324 MG
81 TABLET ORAL DAILY
Qty: 0 | Refills: 0 | Status: DISCONTINUED | OUTPATIENT
Start: 2017-03-22 | End: 2017-03-22

## 2017-03-22 RX ORDER — PANTOPRAZOLE SODIUM 20 MG/1
40 TABLET, DELAYED RELEASE ORAL
Qty: 0 | Refills: 0 | Status: DISCONTINUED | OUTPATIENT
Start: 2017-03-22 | End: 2017-03-22

## 2017-03-22 RX ORDER — ACETAMINOPHEN 500 MG
2 TABLET ORAL
Qty: 0 | Refills: 0 | COMMUNITY

## 2017-03-22 RX ORDER — DOCUSATE SODIUM 100 MG
1 CAPSULE ORAL
Qty: 0 | Refills: 0 | COMMUNITY
Start: 2017-03-22

## 2017-03-22 RX ORDER — SENNA PLUS 8.6 MG/1
2 TABLET ORAL
Qty: 0 | Refills: 0 | COMMUNITY
Start: 2017-03-22

## 2017-03-22 RX ORDER — ACETAMINOPHEN 500 MG
2 TABLET ORAL
Qty: 0 | Refills: 0 | COMMUNITY
Start: 2017-03-22

## 2017-03-22 RX ORDER — OXYCODONE HYDROCHLORIDE 5 MG/1
2 TABLET ORAL
Qty: 24 | Refills: 0 | OUTPATIENT
Start: 2017-03-22 | End: 2017-03-25

## 2017-03-22 RX ADMIN — Medication 216 GRAM(S): at 12:08

## 2017-03-22 RX ADMIN — Medication 100 MILLIGRAM(S): at 05:45

## 2017-03-22 RX ADMIN — HEPARIN SODIUM 5000 UNIT(S): 5000 INJECTION INTRAVENOUS; SUBCUTANEOUS at 05:45

## 2017-03-22 RX ADMIN — Medication 216 GRAM(S): at 00:30

## 2017-03-22 RX ADMIN — Medication 216 GRAM(S): at 05:45

## 2017-03-22 RX ADMIN — LOSARTAN POTASSIUM 100 MILLIGRAM(S): 100 TABLET, FILM COATED ORAL at 05:45

## 2017-03-22 NOTE — DISCHARGE NOTE ADULT - HOSPITAL COURSE
Underwent uncomplicated Robot assisted lap Right Ureteral reimplant and Distal R ureterectomy on 3/20.  Postoperative course uneventful, pain controlled, ambulating.  Return of GI function on POD #2, diet advanced without incident. Pt to go home with foly cath, ureteral cath, and R nephrostomy tube to drainage.  Pt d/c on POD #2 to f/u with Dr. Wilcox on Tuesday 3/28.

## 2017-03-22 NOTE — DISCHARGE NOTE ADULT - REASON FOR ADMISSION
"ureter cancer" treatment of R obstructive R ureteral cancer via Robotic R Ureteral Reimplant, R PLND, Distal Uretectomy

## 2017-03-22 NOTE — DISCHARGE NOTE ADULT - CARE PROVIDERS DIRECT ADDRESSES
[email or web address]

## 2017-03-22 NOTE — DISCHARGE NOTE ADULT - INSTRUCTIONS
Maintain Nephrostomy tube in place as well as Jackson with U-cayh in place to leg bag drainage. Keep CAITIE drain site clean dry and intact with Dry sterile dressing. Slowly resume previous diet.  Initially avoid fatty, greasy foods and large meals  Increase fluids  call office if you have fever >101,difficulty urinating ,Nausea, Vomiting, Inability to eat, Maintain Nephrostomy tube in place as well as Jackson with U-cath in place to leg bag drainage systems. Keep CAITIE drain site clean dry and intact with Dry sterile dressing. Call MD with any signs of infection ie fever/shaking chills, redness or drainage, or with signs of bleeding or persistent nausea. Continue to drink plenty of fluids, avoid strenuous activity and constipation which may be a side effect from taking narcotic pain meds. No heavy lifting greater than 10 pounds ie a gallon of milk. Follow-up at MD office as instructed for post-op check as per.

## 2017-03-22 NOTE — DISCHARGE NOTE ADULT - CARE PLAN
Principal Discharge DX:	Ureteral tumor  Goal:	Surgical Recovery  Instructions for follow-up, activity and diet:	You have an appointment to see Dr. Wilcox on Tuesday 3/28 ([phone])  No heavy lifting for 2 weeks, showering is OK - No baths, dry any incision sites carefully, if steri strips present they will come off on their own Principal Discharge DX:	Ureteral tumor  Goal:	Surgical Recovery  Instructions for follow-up, activity and diet:	You have an appointment to see Dr. Wilcox on Tuesday 3/28 ([phone])  No heavy lifting for 2 weeks, showering is OK - No baths, dry any incision sites carefully, if steri strips present they will come off on their own Principal Discharge DX:	Ureteral tumor  Goal:	Surgical Recovery  Instructions for follow-up, activity and diet:	You have an appointment to see Dr. Wilcox on Tuesday 3/28 ([phone])  No heavy lifting for 2 weeks, showering is OK - No baths, dry any incision sites carefully, if steri strips present they will come off on their own

## 2017-03-22 NOTE — DISCHARGE NOTE ADULT - PLAN OF CARE
Surgical Recovery You have an appointment to see Dr. Wilcox on Tuesday 3/28 ([phone])  No heavy lifting for 2 weeks, showering is OK - No baths, dry any incision sites carefully, if steri strips present they will come off on their own

## 2017-03-22 NOTE — DISCHARGE NOTE ADULT - OTHER SIGNIFICANT FINDINGS
*CAITIE site Instructions:  Apply small dry sterile dressing once a day or as needed if dressing gets wet or leaks for next few days as instructed in hospital.    *Nephrostomy tube: Clear, water resistant, dressing over gauze.  Visiting Nurse to see patient and evaluate and change dressing on Friday 3/24.

## 2017-03-22 NOTE — DISCHARGE NOTE ADULT - MEDICATION SUMMARY - MEDICATIONS TO TAKE
I will START or STAY ON the medications listed below when I get home from the hospital:    calcium/ magnesium  -- 1 tab(s) by mouth once a day in morning  -- Indication: For Home    vitamin D  -- 1 tab(s) by mouth once a day in morning  -- Indication: For Home    Viagra 100 mg oral tablet  -- 1 tab(s) by mouth once a day, As Needed - last dose 3/18/2017  -- Indication: For Home    Aspirin Low Dose 81 mg oral delayed release tablet  -- 1 tab(s) by mouth once a day in morning - last dose 3/10/2017  -- Indication: For Home    acetaminophen-oxyCODONE 325 mg-5 mg oral tablet  -- 2 tab(s) by mouth every 6 hours, As needed, Moderate to Severe Pain MDD:8  -- Indication: For Post op pain    acetaminophen 325 mg oral tablet  -- 2 tab(s) by mouth every 4 hours, As Needed for mild or moderate pain.  Be careful if taking with Percocet as to avoid exceedind daily tylenol limit  -- Indication: For Mild pain if not taking percocet    Cozaar 100 mg oral tablet  -- 1 tab(s) by mouth once a day in morning  -- Indication: For Home    tamsulosin 0.4 mg oral capsule  -- 2 cap(s) by mouth once a day (at bedtime)  -- Indication: For Home    senna oral tablet  -- 2 tab(s) by mouth once a day (at bedtime)  -- Indication: For constipation    docusate sodium 100 mg oral capsule  -- 1 cap(s) by mouth 3 times a day  -- Indication: For constipation    CoQ10 300 mg oral capsule  -- 1 cap(s) by mouth once a day in morning - last dose 3/10/2017  -- Indication: For Home    Fish Oil oral capsule  -- 1 cap(s) by mouth once a day in morning - last dose 3/10/2017  -- Indication: For Home    amoxicillin-clavulanate 875 mg-125 mg oral tablet  -- 875 milligram(s) by mouth every 12 hours  -- Finish all this medication unless otherwise directed by prescriber.  Take with food or milk.    -- Indication: For post op antibiotic    PriLOSEC  -- 1 cap(s) by mouth once a day in morning  -- Indication: For Home    Vitamin B-100 oral tablet  -- 1 tab(s) by mouth once a day in morning  -- Indication: For Home    multivitamin  -- 1 tab(s) by mouth once a day in morning- last dose 3/13/2017  -- Indication: For Home

## 2017-03-22 NOTE — DISCHARGE NOTE ADULT - ADDITIONAL INSTRUCTIONS
You have an appointment to see Dr. Wilcox on Tuesday 3/28 ([phone])  No heavy lifting for 2 weeks, showering is OK - No baths, dry any incision sites carefully, if steri strips present they will come off on their own.

## 2017-03-22 NOTE — DISCHARGE NOTE ADULT - CONDITIONS AT DISCHARGE
patient has Jackson catheter with U-cath intact to SGD to leg bag drainage as well as Nephrostomy tube to SGD leg bag. DSD to Previous CAITIE site in place, daniel po diet well, no nausea. VSS afebrile. Pt cleared for Dc to home per safe Dc plan.

## 2017-03-22 NOTE — DISCHARGE NOTE ADULT - PATIENT PORTAL LINK FT
“You can access the Follow[employer] Patient Portal, offered by [facility], by registering with the following website: [email or web address]”

## 2017-03-22 NOTE — DISCHARGE NOTE ADULT - NS AS DC FOLLOWUP STROKE INST
Smoking Cessation Smoking Cessation/[place] Jackson catheter, nephrostomy tube, Percocet, Augmentn, colace senna

## 2017-03-22 NOTE — DISCHARGE NOTE ADULT - NS AS ACTIVITY OBS
Walking-Outdoors allowed/Stairs allowed/Showering allowed/No Heavy lifting/straining/Walking-Indoors allowed

## 2017-03-27 LAB — SURGICAL PATHOLOGY STUDY: SIGNIFICANT CHANGE UP

## 2017-03-28 ENCOUNTER — APPOINTMENT (OUTPATIENT)
Dept: UROLOGY | Facility: CLINIC | Age: 82
End: 2017-03-28

## 2017-03-28 VITALS
DIASTOLIC BLOOD PRESSURE: 80 MMHG | HEART RATE: 75 BPM | RESPIRATION RATE: 16 BRPM | SYSTOLIC BLOOD PRESSURE: 157 MMHG | TEMPERATURE: 98.4 F

## 2017-03-28 RX ORDER — SULFAMETHOXAZOLE AND TRIMETHOPRIM 800; 160 MG/1; MG/1
800-160 TABLET ORAL
Qty: 28 | Refills: 0 | Status: ACTIVE | COMMUNITY
Start: 2017-03-28 | End: 1900-01-01

## 2017-03-29 ENCOUNTER — OUTPATIENT (OUTPATIENT)
Dept: OUTPATIENT SERVICES | Facility: HOSPITAL | Age: 82
LOS: 1 days | End: 2017-03-29
Payer: MEDICARE

## 2017-03-29 ENCOUNTER — APPOINTMENT (OUTPATIENT)
Dept: UROLOGY | Facility: CLINIC | Age: 82
End: 2017-03-29

## 2017-03-29 VITALS — DIASTOLIC BLOOD PRESSURE: 82 MMHG | TEMPERATURE: 98.3 F | HEART RATE: 76 BPM | SYSTOLIC BLOOD PRESSURE: 153 MMHG

## 2017-03-29 DIAGNOSIS — Z98.890 OTHER SPECIFIED POSTPROCEDURAL STATES: Chronic | ICD-10-CM

## 2017-03-29 DIAGNOSIS — Z87.438 PERSONAL HISTORY OF OTHER DISEASES OF MALE GENITAL ORGANS: Chronic | ICD-10-CM

## 2017-03-29 DIAGNOSIS — R35.0 FREQUENCY OF MICTURITION: ICD-10-CM

## 2017-03-29 DIAGNOSIS — D49.5 NEOPLASM OF UNSPECIFIED BEHAVIOR OF OTHER GENITOURINARY ORGANS: Chronic | ICD-10-CM

## 2017-03-29 DIAGNOSIS — Z98.49 CATARACT EXTRACTION STATUS, UNSPECIFIED EYE: Chronic | ICD-10-CM

## 2017-03-29 PROCEDURE — 51702 INSERT TEMP BLADDER CATH: CPT | Mod: 58

## 2017-03-31 DIAGNOSIS — R33.9 RETENTION OF URINE, UNSPECIFIED: ICD-10-CM

## 2017-04-04 ENCOUNTER — OUTPATIENT (OUTPATIENT)
Dept: OUTPATIENT SERVICES | Facility: HOSPITAL | Age: 82
LOS: 1 days | End: 2017-04-04
Payer: MEDICARE

## 2017-04-04 ENCOUNTER — APPOINTMENT (OUTPATIENT)
Dept: UROLOGY | Facility: CLINIC | Age: 82
End: 2017-04-04

## 2017-04-04 VITALS
DIASTOLIC BLOOD PRESSURE: 75 MMHG | RESPIRATION RATE: 16 BRPM | TEMPERATURE: 98.2 F | SYSTOLIC BLOOD PRESSURE: 155 MMHG | HEART RATE: 85 BPM

## 2017-04-04 DIAGNOSIS — Z98.49 CATARACT EXTRACTION STATUS, UNSPECIFIED EYE: Chronic | ICD-10-CM

## 2017-04-04 DIAGNOSIS — D49.5 NEOPLASM OF UNSPECIFIED BEHAVIOR OF OTHER GENITOURINARY ORGANS: Chronic | ICD-10-CM

## 2017-04-04 DIAGNOSIS — R35.0 FREQUENCY OF MICTURITION: ICD-10-CM

## 2017-04-04 DIAGNOSIS — Z87.438 PERSONAL HISTORY OF OTHER DISEASES OF MALE GENITAL ORGANS: Chronic | ICD-10-CM

## 2017-04-04 DIAGNOSIS — Z98.890 OTHER SPECIFIED POSTPROCEDURAL STATES: Chronic | ICD-10-CM

## 2017-04-04 PROCEDURE — 74425 UROGRAPHY ANTEGRADE RS&I: CPT

## 2017-04-04 PROCEDURE — 51600 INJECTION FOR BLADDER X-RAY: CPT

## 2017-04-05 ENCOUNTER — TRANSCRIPTION ENCOUNTER (OUTPATIENT)
Age: 82
End: 2017-04-05

## 2017-04-10 DIAGNOSIS — R33.9 RETENTION OF URINE, UNSPECIFIED: ICD-10-CM

## 2017-04-18 ENCOUNTER — OUTPATIENT (OUTPATIENT)
Dept: OUTPATIENT SERVICES | Facility: HOSPITAL | Age: 82
LOS: 1 days | End: 2017-04-18
Payer: MEDICARE

## 2017-04-18 ENCOUNTER — APPOINTMENT (OUTPATIENT)
Dept: UROLOGY | Facility: CLINIC | Age: 82
End: 2017-04-18

## 2017-04-18 VITALS
TEMPERATURE: 98 F | HEART RATE: 98 BPM | SYSTOLIC BLOOD PRESSURE: 185 MMHG | DIASTOLIC BLOOD PRESSURE: 82 MMHG | RESPIRATION RATE: 16 BRPM

## 2017-04-18 DIAGNOSIS — Z87.438 PERSONAL HISTORY OF OTHER DISEASES OF MALE GENITAL ORGANS: Chronic | ICD-10-CM

## 2017-04-18 DIAGNOSIS — Z98.49 CATARACT EXTRACTION STATUS, UNSPECIFIED EYE: Chronic | ICD-10-CM

## 2017-04-18 DIAGNOSIS — D49.5 NEOPLASM OF UNSPECIFIED BEHAVIOR OF OTHER GENITOURINARY ORGANS: Chronic | ICD-10-CM

## 2017-04-18 DIAGNOSIS — Z98.890 OTHER SPECIFIED POSTPROCEDURAL STATES: Chronic | ICD-10-CM

## 2017-04-18 DIAGNOSIS — R35.0 FREQUENCY OF MICTURITION: ICD-10-CM

## 2017-04-18 PROCEDURE — 76872 US TRANSRECTAL: CPT

## 2017-04-18 PROCEDURE — 51797 INTRAABDOMINAL PRESSURE TEST: CPT

## 2017-04-18 PROCEDURE — 51784 ANAL/URINARY MUSCLE STUDY: CPT

## 2017-04-18 PROCEDURE — 51728 CYSTOMETROGRAM W/VP: CPT

## 2017-04-18 PROCEDURE — 51600 INJECTION FOR BLADDER X-RAY: CPT

## 2017-04-18 PROCEDURE — 51741 ELECTRO-UROFLOWMETRY FIRST: CPT

## 2017-04-18 PROCEDURE — 76000 FLUOROSCOPY <1 HR PHYS/QHP: CPT | Mod: 59

## 2017-04-26 DIAGNOSIS — R33.9 RETENTION OF URINE, UNSPECIFIED: ICD-10-CM

## 2017-04-26 DIAGNOSIS — N40.1 BENIGN PROSTATIC HYPERPLASIA WITH LOWER URINARY TRACT SYMPTOMS: ICD-10-CM

## 2017-05-17 ENCOUNTER — APPOINTMENT (OUTPATIENT)
Dept: UROLOGY | Facility: HOSPITAL | Age: 82
End: 2017-05-17

## 2018-07-24 PROBLEM — Z80.0 FAMILY HISTORY OF COLON CANCER: Status: ACTIVE | Noted: 2017-03-13

## 2018-11-08 NOTE — H&P PST ADULT - AS BP NONINV METHOD
Medical Week 1 Survey      Responses   Facility patient discharged from?  [place]   Does the patient have one of the following disease processes/diagnoses(primary or secondary)?  Other   Is there a successful TCM telephone encounter documented?  No   Week 1 attempt successful?  No   Unsuccessful attempts  Attempt 1          Trell Leon RN   electronic auscultated w/ stethoscope

## 2019-10-02 ENCOUNTER — RESULT REVIEW (OUTPATIENT)
Age: 84
End: 2019-10-02

## 2019-12-18 NOTE — PACU DISCHARGE NOTE - PAIN:
Controlled with current regime Hide Biopsy Depth?: No Cryotherapy Text: The wound bed was treated with cryotherapy after the biopsy was performed. Biopsy Method: 15 blade Lab: [id number] [facility] [address] Wound Care: Petrolatum Electrodesiccation Text: The wound bed was treated with electrodesiccation after the biopsy was performed. Post-Care Instructions: I reviewed with the patient in detail post-care instructions. Patient is to keep the biopsy site dry overnight, and then apply bacitracin twice daily until healed. Patient may apply hydrogen peroxide soaks to remove any crusting. Size Of Lesion In Cm: 0.4 Billing Type: [employer] Type Of Destruction Used: Curettage Depth Of Biopsy: dermis Anesthesia Volume In Cc (Will Not Render If 0): 0.5 Biopsy Type: H and E Hemostasis: Sindhu's Dressing: bandage Electrodesiccation And Curettage Text: The wound bed was treated with electrodesiccation and curettage after the biopsy was performed. Notification Instructions: Patient will be notified of biopsy results. However, patient instructed to call the office if not contacted within 2 weeks. Consent: Written consent was obtained and risks were reviewed including but not limited to scarring, infection, bleeding, scabbing, incomplete removal, nerve damage and allergy to anesthesia. X Size Of Lesion In Cm: 0 Silver Nitrate Text: The wound bed was treated with silver nitrate after the biopsy was performed. Was A Bandage Applied: Yes Curettage Text: The wound bed was treated with curettage after the biopsy was performed. Anesthesia Type: 1% lidocaine with epinephrine Detail Level: Detailed Lab: [facility] Billing Type: Third-Party Bill Lab Facility: [address] [facility] Size Of Lesion In Cm: 1 Lab: [id number] Lab Facility: [id number] Size Of Lesion In Cm: 0.6

## 2020-07-19 ENCOUNTER — APPOINTMENT (OUTPATIENT)
Dept: DISASTER EMERGENCY | Facility: CLINIC | Age: 85
End: 2020-07-19

## 2021-01-20 ENCOUNTER — RESULT REVIEW (OUTPATIENT)
Age: 86
End: 2021-01-20

## 2022-10-26 ENCOUNTER — RESULT REVIEW (OUTPATIENT)
Age: 87
End: 2022-10-26

## 2022-10-28 NOTE — PATIENT PROFILE ADULT. - NSALCOHOLAMT_GEN_A_CORE_SD
76  year old  RHD male PMH of HTN, HLD, DM2, former smoker,  hypothyroidism, obesity, LILLY on C-pap,  bladder cancer (2020, s/p resection of bladder tumor/ currently self catheterizes 6x/day ), PAF s/p DCCV /uncomplicated radiofrequency ablation of atrial fibrillation ([facility]/PVI, CTI) on 7/13/22,  left atrial appendage & severe MR . Presents with c/o recent" abnormal echo with fatigue due to mitral valve regurgitation. Presents for  scheduled Mitral valve replacement , MAZE, left atrial appendage closure on 10/20/2022.  10/24/22   MVr - triangular resection with 28mm Bobby band  AtriClip 45mm   Dawn-Maze IV procedure  PFO CLOSURE  Code stroke called for LUE convulsion with residual LUE weakness immediately after, Patient had LUE convulsions that spread to entire body  CT done, neuro following:  Stroke on differential as well given vague lucency at the genu of the right internal capsule and in the right thalamus of indeterminate age. Likely lacunar infarcts.   MRI when capable , eeg and Keppra added  Extubated d 1  + pressors, prbc's  EEG prelim neg.  Pt with lethargy post keppra, d/c since eeg neg  10/ 26 EEG reviewed by neuro, although no seizures  he is at risk for further sezures, recommended resuming keppra and if lethargic ,  change to vimpat  Pt with junctional / afib  Transferred to [facility]  10/28 Maintain PW for junctional rhythm, sorbitol for constipation. Transferred to [address] [place] [address]  10/27 Junctional 50-60, remains on amio 200bid, no beta blocker   Cont keppra for now.  Neuro f/u  D/c jared drain.       1-2 drinks

## 2023-02-04 NOTE — ASU PREOP CHECKLIST - DENTURES
Refill Decision Note   Chikis Epstein  is requesting a refill authorization.  Brief Assessment and Rationale for Refill:  Approve     Medication Therapy Plan:       Medication Reconciliation Completed: No   Comments:     Provider Staff:     Action is required for this patient.   Please see care gap opportunities below in Care Due Message.     Thanks!  [facility]     Appointments      Date Provider   Last Visit   10/3/2022 Cornell Garcia, DO   Next Visit   4/4/2023 Cornell Garcia,      Note composed:2:47 PM 02/04/2023           Note composed:2:47 PM 02/04/2023           no

## 2023-09-20 NOTE — ASU PREOP CHECKLIST - ORDERS/MEDICATION ADMINISTRATION RECORD ON CHART
Quality 226: Preventive Care And Screening: Tobacco Use: Screening And Cessation Intervention: Patient screened for tobacco use and is an ex/non-smoker
done
Quality 402: Tobacco Use And Help With Quitting Among Adolescents: Patient screened for tobacco and never smoked
Detail Level: Detailed

## 2024-01-08 NOTE — DISCHARGE NOTE ADULT - HAS THE PATIENT RECEIVED THE INFLUENZA VACCINE DURING THIS VISIT
The patient's goals for the shift include safety    The clinical goals for the shift include Patient will maintain vitals WNL         No

## 2024-07-09 NOTE — H&P PST ADULT - BMI (KG/M2)
Quality 226: Preventive Care And Screening: Tobacco Use: Screening And Cessation Intervention: Patient screened for tobacco use, is a smoker AND received Cessation Counseling within measurement period or in the six months prior to the measurement period
Detail Level: Detailed
Quality 130: Documentation Of Current Medications In The Medical Record: Current Medications Documented
23.5
Quality 431: Preventive Care And Screening: Unhealthy Alcohol Use - Screening: Patient not identified as an unhealthy alcohol user when screened for unhealthy alcohol use using a systematic screening method